# Patient Record
Sex: FEMALE | Race: WHITE | NOT HISPANIC OR LATINO | Employment: FULL TIME | URBAN - METROPOLITAN AREA
[De-identification: names, ages, dates, MRNs, and addresses within clinical notes are randomized per-mention and may not be internally consistent; named-entity substitution may affect disease eponyms.]

---

## 2023-11-14 ENCOUNTER — OFFICE VISIT (OUTPATIENT)
Dept: URGENT CARE | Facility: CLINIC | Age: 38
End: 2023-11-14
Payer: COMMERCIAL

## 2023-11-14 VITALS
WEIGHT: 145 LBS | HEART RATE: 102 BPM | BODY MASS INDEX: 23.3 KG/M2 | OXYGEN SATURATION: 97 % | HEIGHT: 66 IN | TEMPERATURE: 97 F | DIASTOLIC BLOOD PRESSURE: 78 MMHG | RESPIRATION RATE: 20 BRPM | SYSTOLIC BLOOD PRESSURE: 123 MMHG

## 2023-11-14 DIAGNOSIS — R05.9 COUGH, UNSPECIFIED TYPE: Primary | ICD-10-CM

## 2023-11-14 LAB
CTP QC/QA: YES
CTP QC/QA: YES
POC MOLECULAR INFLUENZA A AGN: NEGATIVE
POC MOLECULAR INFLUENZA B AGN: NEGATIVE
SARS-COV-2 AG RESP QL IA.RAPID: NEGATIVE

## 2023-11-14 PROCEDURE — 87811 SARS CORONAVIRUS 2 ANTIGEN POCT, MANUAL READ: ICD-10-PCS | Mod: QW,S$GLB,, | Performed by: FAMILY MEDICINE

## 2023-11-14 PROCEDURE — 87502 POCT INFLUENZA A/B MOLECULAR: ICD-10-PCS | Mod: QW,S$GLB,, | Performed by: FAMILY MEDICINE

## 2023-11-14 PROCEDURE — 99203 OFFICE O/P NEW LOW 30 MIN: CPT | Mod: S$GLB,,, | Performed by: FAMILY MEDICINE

## 2023-11-14 PROCEDURE — 87502 INFLUENZA DNA AMP PROBE: CPT | Mod: QW,S$GLB,, | Performed by: FAMILY MEDICINE

## 2023-11-14 PROCEDURE — 87811 SARS-COV-2 COVID19 W/OPTIC: CPT | Mod: QW,S$GLB,, | Performed by: FAMILY MEDICINE

## 2023-11-14 PROCEDURE — 99203 PR OFFICE/OUTPT VISIT, NEW, LEVL III, 30-44 MIN: ICD-10-PCS | Mod: S$GLB,,, | Performed by: FAMILY MEDICINE

## 2023-11-14 RX ORDER — OSELTAMIVIR PHOSPHATE 75 MG/1
75 CAPSULE ORAL 2 TIMES DAILY
Qty: 10 CAPSULE | Refills: 0 | Status: SHIPPED | OUTPATIENT
Start: 2023-11-14 | End: 2023-11-19

## 2023-11-14 RX ORDER — PROMETHAZINE HYDROCHLORIDE 6.25 MG/5ML
SYRUP ORAL
Qty: 120 ML | Refills: 1 | Status: SHIPPED | OUTPATIENT
Start: 2023-11-14

## 2023-11-14 NOTE — PROGRESS NOTES
"Subjective:      Patient ID: Demetria Cordon is a 38 y.o. female.    Vitals:  height is 5' 6" (1.676 m) and weight is 65.8 kg (145 lb). Her temperature is 97.2 °F (36.2 °C). Her blood pressure is 123/78 and her pulse is 102. Her respiration is 20 and oxygen saturation is 97%.     Chief Complaint: COVID-19 Concerns    38 year old female presents today with a fever, cough, congestion, head congestion, chills, sweats, fatigue, lower back pain with pain radiating to legs.  No known exposure to anything. Positive COVID 2020. Flu vaccinated not vaccinated for COVID. Symptoms started about 5 days ago. Treatments at home includes Vicks day time with no relief.     Cough  This is a new problem. The current episode started in the past 7 days. The problem has been gradually worsening. Associated symptoms include chills, a fever, headaches, myalgias, postnasal drip and rhinorrhea. Pertinent negatives include no chest pain, ear congestion, ear pain, heartburn, hemoptysis, nasal congestion, rash, sore throat, shortness of breath, sweats, weight loss or wheezing. She has tried OTC cough suppressant for the symptoms. There is no history of asthma, bronchiectasis, bronchitis, COPD, emphysema, environmental allergies or pneumonia.       Constitution: Positive for chills and fever.   HENT:  Positive for postnasal drip. Negative for ear pain and sore throat.    Cardiovascular:  Negative for chest pain.   Respiratory:  Positive for cough. Negative for bloody sputum, shortness of breath and wheezing.    Gastrointestinal:  Negative for heartburn.   Musculoskeletal:  Positive for muscle ache.   Skin:  Negative for rash.   Allergic/Immunologic: Negative for environmental allergies.   Neurological:  Positive for headaches.      Objective:     Physical Exam    Physical Exam  Vitals signs and nursing note reviewed.   Constitutional:       Appearance: Pt is well-developed. Alert, NAD.  Pt is cooperative.  Non-toxic appearance.  HENT:      " Head: Normocephalic and atraumatic. .      Right Ear: External ear normal.      Left Ear: External ear normal.   Eyes:      General: Lids are normal.      Conjunctiva/sclera: Conjunctivae normal. Visual tracking is normal. Right eye exhibits no exudate. Left eye exhibits no exudate. No scleral icterus.     Pupils: Pupils are equal, round  Neck:      Musculoskeletal: range of motion without pain and neck supple.      Trachea: Trachea and phonation normal.   Throat: No apparent pharyngeal edema or swelling  Cardiovascular:      Rate and Rhythm: Normal Rhythm. Extremities well perfused.   Pulmonary:      Effort: Pulmonary effort is normal. No respiratory distress. NO stridor or difficulty breathing     Breath sounds: Normal breath sounds.   Abdomen: NO obvious distention.  Musculoskeletal: Normal range of motion. No ambulation issues  Skin:     General: Skin is warm and dry. No open wounds or abrasions. No petechiae No cyanosis  no jaundice not diaphoretic, not pale, not purpuric  Neurological:      Mental Status:Pt is alert and oriented to person, place, and time.   Psychiatric:         Speech: Speech normal.         Behavior: Behavior normal.         Thought Content: Thought content normal.         Judgment: Judgment normal.       Assessment:     1. Cough, unspecified type        Plan:       Cough, unspecified type  -     SARS Coronavirus 2 Antigen, POCT Manual Read  -     POCT Influenza A/B MOLECULAR    Other orders  -     oseltamivir (TAMIFLU) 75 MG capsule; Take 1 capsule (75 mg total) by mouth 2 (two) times daily. for 5 days  Dispense: 10 capsule; Refill: 0  -     promethazine (PHENERGAN) 6.25 mg/5 mL syrup; 10mL at night as needed  Dispense: 120 mL; Refill: 1